# Patient Record
Sex: FEMALE | Race: WHITE | URBAN - METROPOLITAN AREA
[De-identification: names, ages, dates, MRNs, and addresses within clinical notes are randomized per-mention and may not be internally consistent; named-entity substitution may affect disease eponyms.]

---

## 2018-06-07 ENCOUNTER — IP HISTORICAL/CONVERTED ENCOUNTER (OUTPATIENT)
Dept: OTHER | Age: 48
End: 2018-06-07

## 2023-07-11 ENCOUNTER — OFFICE VISIT (OUTPATIENT)
Age: 53
End: 2023-07-11
Payer: COMMERCIAL

## 2023-07-11 VITALS — DIASTOLIC BLOOD PRESSURE: 60 MMHG | WEIGHT: 187 LBS | SYSTOLIC BLOOD PRESSURE: 114 MMHG

## 2023-07-11 DIAGNOSIS — N95.0 POSTMENOPAUSAL BLEEDING: Primary | ICD-10-CM

## 2023-07-11 PROCEDURE — 58100 BIOPSY OF UTERUS LINING: CPT | Performed by: OBSTETRICS & GYNECOLOGY

## 2023-07-11 RX ORDER — METFORMIN HYDROCHLORIDE 500 MG/1
1000 TABLET, EXTENDED RELEASE ORAL
COMMUNITY
Start: 2023-01-06 | End: 2024-01-06

## 2023-07-11 RX ORDER — INSULIN LISPRO 100 [IU]/ML
INJECTION, SOLUTION INTRAVENOUS; SUBCUTANEOUS
COMMUNITY
Start: 2023-07-06

## 2023-07-11 RX ORDER — BLOOD-GLUCOSE SENSOR
EACH MISCELLANEOUS
COMMUNITY
Start: 2023-06-19

## 2023-07-11 RX ORDER — ROSUVASTATIN CALCIUM 40 MG/1
40 TABLET, COATED ORAL DAILY
COMMUNITY
Start: 2023-07-01

## 2023-07-11 RX ORDER — CHLORAL HYDRATE 500 MG
2000 CAPSULE ORAL
Qty: 180 CAPSULE | Refills: 15 | COMMUNITY
Start: 2022-09-23 | End: 2024-01-06

## 2023-07-11 RX ORDER — EMPAGLIFLOZIN 25 MG/1
25 TABLET, FILM COATED ORAL DAILY
COMMUNITY
Start: 2023-07-01

## 2023-07-11 RX ORDER — CETIRIZINE HYDROCHLORIDE 10 MG/1
1 TABLET ORAL DAILY
COMMUNITY
Start: 2021-09-01

## 2023-07-11 NOTE — PROGRESS NOTES
Subjective:  Chief Complaints:        1. Endometrial biopsy. HPI:         Namita Treadwell is a 46 y.o. female who complains of a single episode of postmenopausal bleeding. She reports that she has had no period since 2018 until this started a few days ago. .    Current Outpatient Medications   Medication Sig Dispense Refill    Continuous Blood Gluc Sensor (FREESTYLE MAISHA 3 SENSOR) MISC APPLY 1 SENSOR TO SKIN EVERY 14 DAYS      JARDIANCE 25 MG tablet Take 1 tablet by mouth daily      metFORMIN (GLUCOPHAGE-XR) 500 MG extended release tablet Take 2 tablets by mouth      insulin glargine (LANTUS;BASAGLAR) 100 UNIT/ML injection pen Inject 40 Units into the skin nightly 12 mL 11    rosuvastatin (CRESTOR) 40 MG tablet Take 1 tablet by mouth daily      Omega-3 Fatty Acids (FISH OIL) 1000 MG capsule Take 2 capsules by mouth 3 times daily (with meals) 180 capsule 15    cetirizine (ZYRTEC) 10 MG tablet Take 1 tablet by mouth daily      insulin lispro, 1 Unit Dial, (HUMALOG/ADMELOG) 100 UNIT/ML SOPN FOLLOW PRESCRIBER DIRECTIONS TO INJECT 30  UNITS UNDER THE SKIN THREE TIMES DAILY WITH MEALS       No current facility-administered medications for this visit. Objective:  Physical Examination:         GENITOURINARY - FEMALE:     EXTERNAL GENITALS: normal, no lesions. VAGINA:  healthy pink mucosa, without any lesions. CERVIX: downward, normal appearing, no cervical movement tenderness. UTERUS:  normal size, mobile, nontender with movement. OVARIES: no masses felt in adnexa. URETHRA:  Normal.  GENERAL:     HEART: regular rate and rhythm, normal S1S2, no murmurs. LUNGS:  clear to auscultation bilaterally, no wheezes/rhonchi/rales. Assessment:  Postmenopausal bleeding    Plan:  Abnormal vaginal bleeding. Notes:  Endometrial biopsy done today. Info on procedure and risks including:  infection, bleeding, perforation of uterus, and injury to internal organs explained.   Pt verbalizes understanding and wishes to

## 2023-07-13 LAB
CPT BILLING CODE: NORMAL
DIAGNOSIS SYNOPSIS:: NORMAL
DX ICD CODE: NORMAL
PATH REPORT.FINAL DX SPEC: NORMAL
PATH REPORT.GROSS SPEC: NORMAL
PATH REPORT.SITE OF ORIGIN SPEC: NORMAL
PATHOLOGIST NAME: NORMAL
PAYMENT PROCEDURE: NORMAL